# Patient Record
Sex: MALE | Race: WHITE | NOT HISPANIC OR LATINO | Employment: FULL TIME | URBAN - METROPOLITAN AREA
[De-identification: names, ages, dates, MRNs, and addresses within clinical notes are randomized per-mention and may not be internally consistent; named-entity substitution may affect disease eponyms.]

---

## 2024-02-23 ENCOUNTER — TELEPHONE (OUTPATIENT)
Dept: SPORTS MEDICINE | Facility: CLINIC | Age: 64
End: 2024-02-23
Payer: COMMERCIAL

## 2024-02-23 NOTE — TELEPHONE ENCOUNTER
Returned call. No answer and LVM. Advised that several providers are our of the office today and likely would not be able to schedule an appointment for today. Advised that if there was a concern for a fracture, then would recommend going to an urgent care or ER for further evaluation and they can stabilize until patient can be seen by orthopedic provider next week. Left call back number with any questions. AINSLEY    ----- Message from Niki Trent sent at 2/23/2024  9:33 AM CST -----  Regarding: appt  Name of who is calling:   Anabela / wife      What is the request in detail: Wife is requesting a urgent appt today if possible to be seen due to injured right knee and foot swollen from a step down off later / possibly shattering      Can the clinic reply by [facility]:no      What number to call back if not [facility]:[phone]

## 2024-02-26 ENCOUNTER — HOSPITAL ENCOUNTER (OUTPATIENT)
Dept: RADIOLOGY | Facility: HOSPITAL | Age: 64
Discharge: HOME | End: 2024-02-26
Attending: ORTHOPAEDIC SURGERY
Payer: COMMERCIAL

## 2024-02-26 ENCOUNTER — OFFICE VISIT (OUTPATIENT)
Dept: SPORTS MEDICINE | Facility: CLINIC | Age: 64
End: 2024-02-26
Payer: COMMERCIAL

## 2024-02-26 VITALS — HEIGHT: 69 IN | WEIGHT: 220 LBS | BODY MASS INDEX: 32.58 KG/M2

## 2024-02-26 DIAGNOSIS — M17.11 LOCALIZED OSTEOARTHRITIS OF RIGHT KNEE: ICD-10-CM

## 2024-02-26 DIAGNOSIS — M25.561 CHRONIC PAIN OF RIGHT KNEE: ICD-10-CM

## 2024-02-26 DIAGNOSIS — M21.162 GENU VARUM OF LEFT LOWER EXTREMITY: ICD-10-CM

## 2024-02-26 DIAGNOSIS — G89.29 CHRONIC PAIN OF RIGHT KNEE: Primary | ICD-10-CM

## 2024-02-26 DIAGNOSIS — G89.29 CHRONIC PAIN OF RIGHT KNEE: ICD-10-CM

## 2024-02-26 DIAGNOSIS — S83.231A COMPLEX TEAR OF MEDIAL MENISCUS OF RIGHT KNEE AS CURRENT INJURY, INITIAL ENCOUNTER: ICD-10-CM

## 2024-02-26 DIAGNOSIS — M17.0 BILATERAL PRIMARY OSTEOARTHRITIS OF KNEE: ICD-10-CM

## 2024-02-26 DIAGNOSIS — M17.32 POST-TRAUMATIC OSTEOARTHRITIS OF LEFT KNEE: ICD-10-CM

## 2024-02-26 DIAGNOSIS — M25.561 CHRONIC PAIN OF RIGHT KNEE: Primary | ICD-10-CM

## 2024-02-26 PROCEDURE — 99999 PR PBB SHADOW E&M-EST. PATIENT-LVL IV: CPT | Mod: PBBFAC,,, | Performed by: ORTHOPAEDIC SURGERY

## 2024-02-26 PROCEDURE — 97760 ORTHOTIC MGMT&TRAING 1ST ENC: CPT | Mod: S$GLB,,, | Performed by: ORTHOPAEDIC SURGERY

## 2024-02-26 PROCEDURE — 3051F HG A1C>EQUAL 7.0%<8.0%: CPT | Mod: CPTII,S$GLB,, | Performed by: ORTHOPAEDIC SURGERY

## 2024-02-26 PROCEDURE — 1159F MED LIST DOCD IN RCRD: CPT | Mod: CPTII,S$GLB,, | Performed by: ORTHOPAEDIC SURGERY

## 2024-02-26 PROCEDURE — 3008F BODY MASS INDEX DOCD: CPT | Mod: CPTII,S$GLB,, | Performed by: ORTHOPAEDIC SURGERY

## 2024-02-26 PROCEDURE — 99205 OFFICE O/P NEW HI 60 MIN: CPT | Mod: S$GLB,,, | Performed by: ORTHOPAEDIC SURGERY

## 2024-02-26 RX ORDER — EMPAGLIFLOZIN 25 MG/1
25 TABLET, FILM COATED ORAL EVERY MORNING
COMMUNITY

## 2024-02-26 RX ORDER — PEN NEEDLE, DIABETIC 31 GX5/16"
NEEDLE, DISPOSABLE MISCELLANEOUS EVERY MORNING
COMMUNITY

## 2024-02-26 RX ORDER — INSULIN GLARGINE 100 [IU]/ML
40 INJECTION, SOLUTION SUBCUTANEOUS
COMMUNITY
Start: 2024-01-30

## 2024-02-26 RX ORDER — EZETIMIBE 10 MG/1
10 TABLET ORAL EVERY MORNING
COMMUNITY

## 2024-02-26 RX ORDER — METOPROLOL SUCCINATE 25 MG/1
25 TABLET, EXTENDED RELEASE ORAL EVERY MORNING
COMMUNITY

## 2024-02-26 RX ORDER — WARFARIN SODIUM 5 MG/1
5 TABLET ORAL
COMMUNITY
Start: 2023-12-19

## 2024-02-26 RX ORDER — PREGABALIN 150 MG/1
150-300 CAPSULE ORAL 2 TIMES DAILY
COMMUNITY

## 2024-02-26 RX ORDER — ATORVASTATIN CALCIUM 20 MG/1
20 TABLET, FILM COATED ORAL NIGHTLY
COMMUNITY
Start: 2023-10-06

## 2024-02-26 RX ORDER — ROSUVASTATIN CALCIUM 10 MG/1
10 TABLET, COATED ORAL NIGHTLY
COMMUNITY

## 2024-02-26 NOTE — PATIENT INSTRUCTIONS
Assessment:  Ulises Altman is a  63 y.o. male    with a chief complaint of Pain of the Right Knee    Acute Right knee pain and swelling  Started 2/22/24  Left knee post traumatic osteoarthritis  Genu varum  Right knee osteoarthritis     Encounter Diagnoses   Name Primary?    Chronic pain of right knee Yes    Post-traumatic osteoarthritis of left knee     Localized osteoarthritis of right knee     Complex tear of medial meniscus of right knee as current injury, initial encounter     Bilateral primary osteoarthritis of knee     Genu varum of left lower extremity       Plan:  Knee Sleeve for right knee  Deferred right knee CSI 2/2/40- due to history of total heart surgery reccently   Would need clearance from cardiovascular surgeon to proceed  Dr. Gamino   Medial  brace required for valgus instability due to knee varus from medial compartment osteoarthritis for left knee  10 minutes were spent sizing, fitting, and educating regarding durable medical equipment by Dr. Nick Small and his assistant under his direction today.  CPT 08747.  Order bilateral knee visco   Order PT at [facility] - 2-3x per week for 6-8 weeks      Follow-up: 4 weeks or sooner if there are any problems between now and then.    Leave Review:   Google: Leave Google Review  Healthgrades: Leave Healthgrades Review    After Hours Number: [phone]

## 2024-02-26 NOTE — PROGRESS NOTES
"      Patient ID: Ulises Altman  YOB: 1960  MRN: [id number]    Chief Complaint: Pain of the Right Knee      Referred By: First available    History of Present Illness: Ulises Altman is a  63 y.o. male    with a chief complaint of Pain of the Right Knee    The patient presents today with right knee pain. He reports a possible injury last Thursday, 2/22/34. He reports he was stepping up on a ladder and felt a pop. He reports pain and swelling. He reports it started as posterior knee pain. He reports not the pain is medial. He reports swelling. Of note he has a history of a football injury on the Left knee of which underwent reconstruction years ago. He reports pain with walking and bending his knee. Of note he underwent open heart surgery 10 months ago.     HPI    Past Medical History:   History reviewed. No pertinent past medical history.  History reviewed. No pertinent surgical history.  History reviewed. No pertinent family history.  Social History     Socioeconomic History    Marital status:      Medication List with Changes/Refills   Current Medications    ASPIRIN 81 MG CAP        ATORVASTATIN (LIPITOR) 20 MG TABLET    Take 20 mg by mouth every evening.    BD ULTRA-FINE SHORT PEN NEEDLE 31 GAUGE X 5/16" NDLE    Inject into the skin every morning.    BLOOD SUGAR DIAGNOSTIC STRP    by Other route.    EZETIMIBE (ZETIA) 10 MG TABLET    Take 10 mg by mouth every morning.    JARDIANCE 25 MG TABLET    Take 25 mg by mouth every morning.    LANTUS SOLOSTAR U-100 INSULIN GLARGINE 100 UNITS/ML SUBQ PEN    Inject 40 Units into the skin.    METOPROLOL SUCCINATE (TOPROL-XL) 25 MG 24 HR TABLET    Take 25 mg by mouth every morning.    PREGABALIN (LYRICA) 150 MG CAPSULE    Take 150-300 mg by mouth 2 (two) times daily.    ROSUVASTATIN (CRESTOR) 10 MG TABLET    Take 10 mg by mouth every evening.    WARFARIN (COUMADIN) 5 MG TABLET    Take 5 mg by mouth.     Review of patient's allergies " indicates:  No Known Allergies  ROS    Physical Exam:   Body mass index is 32.49 kg/m².  There were no vitals filed for this visit.   GENERAL: Well appearing, appropriate for stated age, no acute distress.  CARDIOVASCULAR: Pulses regular by peripheral palpation.  PULMONARY: Respirations are even and non-labored.  NEURO: Awake, alert, and oriented x 3.  PSYCH: Mood & affect are appropriate.  HEENT: Head is normocephalic and atraumatic.            Right Knee Exam     Inspection   Effusion: present    Tenderness   The patient is tender to palpation of the medial joint line.    Range of Motion   Extension:  5   Flexion:  110     Tests   Meniscus   Janell:  Medial - positive   Ligament Examination   Lachman: normal (-1 to 2mm)   MCL - Valgus: normal (0 to 2mm)  LCL - Varus: normal    Other   Sensation: normal    Comments:  Intact EHL, FHL, gastrocsoleus, and tibialis anterior. Sensation intact to light touch in superficial peroneal, deep peroneal, tibial, sural, and saphenous nerve distributions. Foot warm and well perfused with capillary refill of less than 2 seconds and palpable pedal pulses.      Left Knee Exam     Tenderness   The patient tender to palpation of the medial joint line.    Range of Motion   Extension:  20   Flexion:  110     Muscle Strength   Right Lower Extremity   Hip Abduction: 5/5   Quadriceps:  4/5   Hamstrin/5     Vascular Exam     Right Pulses  Dorsalis Pedis:      2+  Posterior Tibial:      2+          Imaging:    X-ray Knee Ortho Right with Flexion  Narrative: EXAMINATION:  XR KNEE ORTHO RIGHT WITH FLEXION    CLINICAL HISTORY:  Pain in right knee    TECHNIQUE:  AP standing views of both knees, AP flexion views of both knees, lateral view of the right knee and Merchant views of both knees    COMPARISON:  None    FINDINGS:  The medial and lateral compartment joint spaces of the right knee appear to be relatively well maintained.  There is severe joint space narrowing seen involving the  medial compartment the left knee with large staples seen projecting over the medial tibial plateau.  Minimal degenerative involving the right patellofemoral compartment.  A small suprapatellar joint effusion is present.  No acute fracture or dislocation.  Impression: 1.  As above    Electronically signed by: Anton Rodas DO  Date:    02/26/2024  Time:    10:00      Relevant imaging results reviewed and interpreted by me, discussed with the patient and / or family today.     Other Tests:         Patient Instructions   Assessment:  Ulises Altman is a  63 y.o. male    with a chief complaint of Pain of the Right Knee    Acute Right knee pain and swelling  Started 2/22/24  Left knee post traumatic osteoarthritis  Genu varum  Right knee osteoarthritis     Encounter Diagnoses   Name Primary?    Chronic pain of right knee Yes    Post-traumatic osteoarthritis of left knee     Localized osteoarthritis of right knee     Complex tear of medial meniscus of right knee as current injury, initial encounter     Bilateral primary osteoarthritis of knee     Genu varum of left lower extremity       Plan:  Knee Sleeve for right knee  Deferred right knee CSI 2/2/40- due to history of total heart surgery reccently   Would need clearance from cardiovascular surgeon to proceed  Dr. Gamino   Medial  brace required for valgus instability due to knee varus from medial compartment osteoarthritis for left knee  10 minutes were spent sizing, fitting, and educating regarding durable medical equipment by Dr. Nick Small and his assistant under his direction today.  CPT 19700.  Order bilateral knee visco   Order PT at [facility] - 2-3x per week for 6-8 weeks      Follow-up: 4 weeks or sooner if there are any problems between now and then.    Leave Review:   Google: Leave Google Review  Healthgrades: Leave Healthgrades Review    After Hours Number: [phone]       Provider Note/Medical Decision Making:       I  discussed worrisome and red flag signs and symptoms with the patient. The patient expressed understanding and agreed to alert me immediately or to go to the emergency room if they experience any of these.   Treatment plan was developed with input from the patient/family, and they expressed understanding and agreement with the plan. All questions were answered today.          Nick Small MD  Orthopaedic Surgery & Sports Medicine       Disclaimer: This note was prepared using a voice recognition system and is likely to have sound alike errors within the text.     I, Tosha Vazquez, acted as a scribe for Nick Small MD for the duration of this office visit.

## 2024-03-04 ENCOUNTER — CLINICAL SUPPORT (OUTPATIENT)
Facility: HOSPITAL | Age: 64
End: 2024-03-04
Payer: COMMERCIAL

## 2024-03-04 DIAGNOSIS — M25.662 DECREASED RANGE OF MOTION OF LEFT KNEE: Primary | ICD-10-CM

## 2024-03-04 DIAGNOSIS — M17.32 POST-TRAUMATIC OSTEOARTHRITIS OF LEFT KNEE: ICD-10-CM

## 2024-03-04 DIAGNOSIS — M62.81 QUADRICEPS WEAKNESS: ICD-10-CM

## 2024-03-04 PROCEDURE — 97161 PT EVAL LOW COMPLEX 20 MIN: CPT | Mod: PN

## 2024-03-04 PROCEDURE — 97110 THERAPEUTIC EXERCISES: CPT | Mod: PN

## 2024-03-04 NOTE — PLAN OF CARE
[facility]   Physical Therapy Initial Evaluation      Date: 3/4/2024   Name: Ulises Altman  [facility] Number: [id number]    Therapy Diagnosis:   Encounter Diagnoses   Name Primary?    Post-traumatic osteoarthritis of left knee     Decreased range of motion of left knee Yes    Quadriceps weakness       Physician: Nick Small MD     Physician Orders: PT Eval and Treat  Medical Diagnosis from Referral: Post-traumatic osteoarthritis of left knee [M17.32]   Evaluation Date: 3/4/2024  Authorization Period Expiration: 2/25/25  Plan of Care Expiration: 5/4/2024  Progress Note Due: 4/4/2024  Visit # / Visits authorized: 1/1   FOTO: 1/3 (last performed on 3/4/2024)    Precautions: Standard, History of major heart surgery    Time In: 11:00 AM  Time Out: 11:45 AM  Total Billable Time (timed & untimed codes): 45 minutes    Subjective     Date of onset: chronic    History of current condition - Bert reports he has had chronic L knee pain for years. He also reports a history of heart surgery about 1 year ago. He reports after his heart surgery he was not able to move as much and feels like his knee got weaker and more painful.         Pain:  Current 1/10, worst 1/10, best 0/10   Location: [] Right   [x] Left:  knee  Description: aching , unstable  Aggravating Factors: standing from sitting on the floor  Easing Factors: activity avoidance, rest    Prior Therapy:   [x] N/A    [] Yes:   Social History: Pt lives with their family  Occupation: Pt is  - works on copiers, printers, etc  Prior Level of Function: Independent and pain free with all ADL, IADL, community mobility and functional activities.   Current Level of Function: Independent with all ADL, IADL, community mobility and functional activities with reports of increased pain and need for increased time and frequent breaks.       Pts goals: Pt reported goals are to decrease overall pain levels in order to return to prior  functional level.     Medical History:   No past medical history on file.    Surgical History:   Ulises Altman  has no past surgical history on file.    Medications:   Ulises has a current medication list which includes the following prescription(s): aspirin, atorvastatin, bd ultra-fine short pen needle, blood sugar diagnostic, ezetimibe, jardiance, lantus solostar u-100 insulin, metoprolol succinate, pregabalin, rosuvastatin, and warfarin.    Allergies:   Review of patient's allergies indicates:  No Known Allergies     Objective        Range of Motion:   Knee Left    Passive 0-        Lower Extremity Strength  Left LE  Right LE    Knee extension: 4+/5 Knee extension: 4+/5   Knee flexion: 4+/5 Knee flexion: 4+/5   Hip extension:  4/5 Hip extension: 4/5   Hip abduction: 4+/5 Hip abduction: 4+/5         Function:  - Gait within normal limits   - Squat: able to partial squat and rise to stand - increased pain   - Single Leg Squat: not tested  - Single Leg Step Down Test: not tested        Function:     CMS Impairment/Limitation/Restriction for FOTO Knee Survey    Therapist reviewed FOTO scores for Bert on 3/4/2024.   FOTO documents entered into [facility] - see Media section.    Functional Score: 43%         Treatment     Total Treatment time (time-based codes) separate from Evaluation: (10) minutes     Heel Prop TE For knee extension ROM   Gastroc Stretch TE seated   Hamstring stretch TE seated         Patient Education and Home Exercises     Education provided:  PURPOSE: Patient educated on the impairments noted above and the effects of physical therapy intervention to improve overall condition and QOL.   EXERCISE: Patient was educated on all the above exercise prior/during/after for proper posture, positioning, and execution for safe performance with home exercise program.   STRENGTH: Patient educated on the importance of improved core and extremity strength in order to improve alignment of the spine and extremities  "with static positions and dynamic movement.     Written Home Exercises Provided: yes.  Exercises were reviewed and Bert was able to demonstrate them prior to the end of the session.  Bert demonstrated fair  understanding of the education provided. See EMR under Patient Instructions for exercises provided during therapy sessions.    Assessment     Ulises is a 63 y.o. male referred to outpatient Physical Therapy with a medical diagnosis of Post-traumatic osteoarthritis of left knee [M17.32] . Pt presents with impairments in the following categories: IMPAIRMENTS: ROM, strength, endurance, and joint mobility. Patient to consult with cardiology to determine readiness for strength training. Upon approval, prioritize quadriceps strength. He will benefit from continued care.    Pt prognosis is Good  Pt will benefit from skilled outpatient Physical Therapy to address the deficits stated above and in the chart below, provide pt/family education, and to maximize pt's level of independence.     Plan of care discussed with patient: Yes  Pt's spiritual, cultural and educational needs considered and patient is agreeable to the plan of care and goals as stated below:     Anticipated Barriers for therapy: none    Medical Necessity is demonstrated by the following  History  Co-morbidities and personal factors that may impact the plan of care [x] LOW: no personal factors / co-morbidities  [] MODERATE: 1-2 personal factors / co-morbidities  [] HIGH: 3+ personal factors / co-morbidities    Moderate / High Support Documentation:   No past medical history on file.     Examination  Body Structures and Functions, activity limitations and participation restrictions that may impact the plan of care [x] LOW: addressing 1-2 elements  [] MODERATE: 3+ elements  [] HIGH: 4+ elements (please support below)    Moderate / High Support Documentation: See above in "Current Level of Function"      Clinical Presentation [x] LOW: stable  [] MODERATE: " Evolving  [] HIGH: Unstable     Decision Making/ Complexity Score: low         Short Term Goals:  4 weeks Status  Date Met   PAIN: Pt will report worst pain of 3/10 in order to progress toward max functional ability and improve quality of life. [x] Progressing  [] Met  [] Not Met    FUNCTION: Patient will demonstrate improved function as indicated by a functional score of greater than or equal to 50 out of 100 on FOTO. [x] Progressing  [] Met  [] Not Met    MOBILITY: Patient will improve AROM to 50% of stated goals, listed in objective measures above, in order to progress towards independence with functional activities.  [x] Progressing  [] Met  [] Not Met    STRENGTH: Patient will improve strength to 50% of stated goals, listed in objective measures above, in order to progress towards independence with functional activities. [x] Progressing  [] Met  [] Not Met    POSTURE: Patient will correct postural deviations in sitting and standing, to decrease pain and promote long term stability.  [x] Progressing  [] Met  [] Not Met      Long Term Goals:  6 weeks Status Date Met   PAIN: Pt will report worst pain of 2/10 in order to progress toward max functional ability and improve quality of life [x] Progressing  [] Met  [] Not Met    FUNCTION: Patient will demonstrate improved function as indicated by a functional score of greater than or equal to 55 out of 100 on FOTO. [x] Progressing  [] Met  [] Not Met    MOBILITY: Patient will improve AROM to stated goals, listed in objective measures above, in order to return to maximal functional potential and improve quality of life. Goal: Full knee extension range of motion L [x] Progressing  [] Met  [] Not Met    STRENGTH: Patient will improve strength to stated goals, listed in objective measures above, in order to improve functional independence and quality of life. Goal: quad strength 5/5 [x] Progressing  [] Met  [] Not Met      Plan     Plan of care Certification: 3/4/2024 to  5/4/24.    Outpatient Physical Therapy 2 times weekly for 8 weeks to include any combination of the following interventions: virtual visits, dry needling, modalities, electrical stimulation (IFC, Pre-Mod, Attended with Functional Dry Needling), Manual Therapy, Neuromuscular Re-ed, Patient Education, Self Care, Therapeutic Exercise, and Therapeutic Activites       Steven Carbajal, PT, DPT  Physical Therapist  Board-Certified Specialist in Orthopaedic and Sports Physical Therapy  3/4/2024      I CERTIFY THE NEED FOR THESE SERVICES FURNISHED UNDER THIS PLAN OF TREATMENT AND WHILE UNDER MY CARE   Physician's comments:     Physician's Signature: ___________________________________________________

## 2024-03-04 NOTE — PROGRESS NOTES
See plan of care for initial evaluation.        Steven Carbajal, PT, DPT  Physical Therapist  Board-Certified Specialist in Orthopaedic and Sports Physical Therapy  3/4/2024

## 2024-03-15 ENCOUNTER — CLINICAL SUPPORT (OUTPATIENT)
Facility: HOSPITAL | Age: 64
End: 2024-03-15
Payer: COMMERCIAL

## 2024-03-15 DIAGNOSIS — M25.662 DECREASED RANGE OF MOTION OF LEFT KNEE: Primary | ICD-10-CM

## 2024-03-15 DIAGNOSIS — M62.81 QUADRICEPS WEAKNESS: ICD-10-CM

## 2024-03-15 PROCEDURE — 97110 THERAPEUTIC EXERCISES: CPT | Mod: PN

## 2024-03-15 NOTE — PROGRESS NOTES
[facility]   Physical Therapy Treatment Note     Name: Ulises Altman  [facility] Number: [id number]    Therapy Diagnosis:   Encounter Diagnoses   Name Primary?    Decreased range of motion of left knee Yes    Quadriceps weakness      Physician: Nick Small MD    Visit Date: 3/15/2024    Physician Orders: PT Eval and Treat  Medical Diagnosis from Referral: Post-traumatic osteoarthritis of left knee [M17.32]   Evaluation Date: 3/4/2024  Authorization Period Expiration: 2/25/25  Plan of Care Expiration: 5/4/2024  Progress Note Due: 4/4/2024  Visit # / Visits authorized: 1/10 (1/1 eval)  FOTO: 1/3 (last performed on 3/4/2024)    Time In: 10:50  Time Out: 11:45  Total Billable Time: 55 minutes    SUBJECTIVE     Pt reports: he is doing well today. He has no new complaints..  He was compliant with home exercise program.  Response to previous treatment: no notable change  Functional change: no notable change    Pain: 2/10  Location: left knee    OBJECTIVE     Objective Measures updated at progress report unless specified.     Treatment     Bert received the treatments listed below:      Intervention Code  (see below chart) Date/Notes  3/15/24   Upright Bike TE 5 minutes   Standing squats TE 3x10   Matrix Knee Extension TE 10# single leg B - 3x10   Matrix Ham Curl TE 20# single leg B - 3x10   Shuttle Squats: single leg TE 1 band - 3x10 B   Standing Sqauts TE 3x10   55 minutes of Therapeutic Exercise (TE) to develop strength, endurance, ROM, and flexibility. (30157)  0 minutes of Manual therapy (MT) to improve pain and ROM. (83381)  0 minutes of Neuromuscular Re-Education (NR)  to improve: Balance, Coordination, Kinesthetic, Sense, Proprioception, and Posture. (85832)  0 minutes of Therapeutic Activities (TA) to improve functional performance. (21186)  Unattended Electrical Stimulation (ES) for muscle performance and/or pain modulation. (68528)  Vasopneumatic Device Therapy () for management  of swelling/edema. (40745)  BFR: Blood flow restriction applied during exercise  NP: Not Performed    Patient Education and Home Exercises     Home Exercises Provided and Patient Education Provided     Education provided:   - HEP, PT POC    Written Home Exercises Provided: yes. Exercises were reviewed and Bert was able to demonstrate them prior to the end of the session.  Bert demonstrated good  understanding of the education provided. See EMR under Patient Instructions for exercises provided during therapy sessions    ASSESSMENT     Patient tolerated today's treatment well but with significant fatigue. He will benefit from continued care.    Bert Is progressing well towards his goals.   Pt prognosis is Excellent.     Pt will continue to benefit from skilled outpatient physical therapy to address the deficits listed in the problem list box on initial evaluation, provide pt/family education and to maximize pt's level of independence in the home and community environment.     Pt's spiritual, cultural and educational needs considered and pt agreeable to plan of care and goals.     Anticipated barriers to physical therapy: None    Goals:     Short Term Goals:  4 weeks Status  Date Met   PAIN: Pt will report worst pain of 3/10 in order to progress toward max functional ability and improve quality of life. [x] Progressing  [] Met  [] Not Met     FUNCTION: Patient will demonstrate improved function as indicated by a functional score of greater than or equal to 50 out of 100 on FOTO. [x] Progressing  [] Met  [] Not Met     MOBILITY: Patient will improve AROM to 50% of stated goals, listed in objective measures above, in order to progress towards independence with functional activities.  [x] Progressing  [] Met  [] Not Met     STRENGTH: Patient will improve strength to 50% of stated goals, listed in objective measures above, in order to progress towards independence with functional activities. [x] Progressing  [] Met  []  Not Met     POSTURE: Patient will correct postural deviations in sitting and standing, to decrease pain and promote long term stability.  [x] Progressing  [] Met  [] Not Met        Long Term Goals:  6 weeks Status Date Met   PAIN: Pt will report worst pain of 2/10 in order to progress toward max functional ability and improve quality of life [x] Progressing  [] Met  [] Not Met     FUNCTION: Patient will demonstrate improved function as indicated by a functional score of greater than or equal to 55 out of 100 on FOTO. [x] Progressing  [] Met  [] Not Met     MOBILITY: Patient will improve AROM to stated goals, listed in objective measures above, in order to return to maximal functional potential and improve quality of life. Goal: Full knee extension range of motion L [x] Progressing  [] Met  [] Not Met     STRENGTH: Patient will improve strength to stated goals, listed in objective measures above, in order to improve functional independence and quality of life. Goal: quad strength 5/5 [x] Progressing  [] Met  [] Not Met         PLAN   Continue per plan of care.  Progress as tolerated.    Steven Carbajal, PT

## 2024-03-19 ENCOUNTER — CLINICAL SUPPORT (OUTPATIENT)
Facility: HOSPITAL | Age: 64
End: 2024-03-19
Payer: COMMERCIAL

## 2024-03-19 DIAGNOSIS — M62.81 QUADRICEPS WEAKNESS: ICD-10-CM

## 2024-03-19 DIAGNOSIS — M25.662 DECREASED RANGE OF MOTION OF LEFT KNEE: Primary | ICD-10-CM

## 2024-03-19 PROCEDURE — 97110 THERAPEUTIC EXERCISES: CPT | Mod: PN

## 2024-03-20 NOTE — PROGRESS NOTES
[facility]   Physical Therapy Treatment Note     Name: Ulises Altman  [facility] Number: [id number]    Therapy Diagnosis:   Encounter Diagnoses   Name Primary?    Decreased range of motion of left knee Yes    Quadriceps weakness        Physician: Nick Small MD    Visit Date: 3/19/2024    Physician Orders: PT Eval and Treat  Medical Diagnosis from Referral: Post-traumatic osteoarthritis of left knee [M17.32]   Evaluation Date: 3/4/2024  Authorization Period Expiration: 2/25/25  Plan of Care Expiration: 5/4/2024  Progress Note Due: 4/4/2024  Visit # / Visits authorized: 1/10 (1/1 eval)  FOTO: 1/3 (last performed on 3/4/2024)    Time In: 10:55  Time Out: 11:40  Total Billable Time: 45 minutes    SUBJECTIVE     Pt reports: he was somewhat sore after his last visit. Otherwise, he has no new complaints.  He was compliant with home exercise program.  Response to previous treatment: no notable change  Functional change: no notable change    Pain: 2/10  Location: left knee    OBJECTIVE     Objective Measures updated at progress report unless specified.     Treatment     Bert received the treatments listed below:      Intervention Code  (see below chart) Date/Notes  3/19/24   Upright Bike TE 5 minutes   Standing squats TE 3x10   Matrix Knee Extension TE 10# single leg B - 3x10   Matrix Ham Curl TE 20# single leg B - 3x10   Shuttle Squats: single leg TE 1 band - 3x10 B   Standing Squats TE 3x10   45 minutes of Therapeutic Exercise (TE) to develop strength, endurance, ROM, and flexibility. (68494)  0 minutes of Manual therapy (MT) to improve pain and ROM. (09103)  0 minutes of Neuromuscular Re-Education (NR)  to improve: Balance, Coordination, Kinesthetic, Sense, Proprioception, and Posture. (68780)  0 minutes of Therapeutic Activities (TA) to improve functional performance. (88235)  Unattended Electrical Stimulation (ES) for muscle performance and/or pain modulation. (07698)  Vasopneumatic  Device Therapy () for management of swelling/edema. (52893)  BFR: Blood flow restriction applied during exercise  NP: Not Performed    Patient Education and Home Exercises     Home Exercises Provided and Patient Education Provided     Education provided:   - HEP, PT POC    Written Home Exercises Provided: yes. Exercises were reviewed and Bert was able to demonstrate them prior to the end of the session.  Bert demonstrated good  understanding of the education provided. See EMR under Patient Instructions for exercises provided during therapy sessions    ASSESSMENT     Bert demonstrates improved exercise tolerance today with less fatigue. He will benefit from continued care.    Bert Is progressing well towards his goals.   Pt prognosis is Excellent.     Pt will continue to benefit from skilled outpatient physical therapy to address the deficits listed in the problem list box on initial evaluation, provide pt/family education and to maximize pt's level of independence in the home and community environment.     Pt's spiritual, cultural and educational needs considered and pt agreeable to plan of care and goals.     Anticipated barriers to physical therapy: None    Goals:     Short Term Goals:  4 weeks Status  Date Met   PAIN: Pt will report worst pain of 3/10 in order to progress toward max functional ability and improve quality of life. [x] Progressing  [] Met  [] Not Met     FUNCTION: Patient will demonstrate improved function as indicated by a functional score of greater than or equal to 50 out of 100 on FOTO. [x] Progressing  [] Met  [] Not Met     MOBILITY: Patient will improve AROM to 50% of stated goals, listed in objective measures above, in order to progress towards independence with functional activities.  [x] Progressing  [] Met  [] Not Met     STRENGTH: Patient will improve strength to 50% of stated goals, listed in objective measures above, in order to progress towards independence with functional  activities. [x] Progressing  [] Met  [] Not Met     POSTURE: Patient will correct postural deviations in sitting and standing, to decrease pain and promote long term stability.  [x] Progressing  [] Met  [] Not Met        Long Term Goals:  6 weeks Status Date Met   PAIN: Pt will report worst pain of 2/10 in order to progress toward max functional ability and improve quality of life [x] Progressing  [] Met  [] Not Met     FUNCTION: Patient will demonstrate improved function as indicated by a functional score of greater than or equal to 55 out of 100 on FOTO. [x] Progressing  [] Met  [] Not Met     MOBILITY: Patient will improve AROM to stated goals, listed in objective measures above, in order to return to maximal functional potential and improve quality of life. Goal: Full knee extension range of motion L [x] Progressing  [] Met  [] Not Met     STRENGTH: Patient will improve strength to stated goals, listed in objective measures above, in order to improve functional independence and quality of life. Goal: quad strength 5/5 [x] Progressing  [] Met  [] Not Met         PLAN   Continue per plan of care.  Progress as tolerated.    Steven Carbajal, PT

## 2024-03-22 ENCOUNTER — CLINICAL SUPPORT (OUTPATIENT)
Facility: HOSPITAL | Age: 64
End: 2024-03-22
Payer: COMMERCIAL

## 2024-03-22 DIAGNOSIS — M25.662 DECREASED RANGE OF MOTION OF LEFT KNEE: Primary | ICD-10-CM

## 2024-03-22 DIAGNOSIS — M62.81 QUADRICEPS WEAKNESS: ICD-10-CM

## 2024-03-22 PROCEDURE — 97110 THERAPEUTIC EXERCISES: CPT | Mod: PN

## 2024-03-22 NOTE — PROGRESS NOTES
[facility]   Physical Therapy Treatment Note     Name: Ulises Altman  [facility] Number: [id number]    Therapy Diagnosis:   Encounter Diagnoses   Name Primary?    Decreased range of motion of left knee Yes    Quadriceps weakness        Physician: Nick Small MD    Visit Date: 3/22/2024    Physician Orders: PT Eval and Treat  Medical Diagnosis from Referral: Post-traumatic osteoarthritis of left knee [M17.32]   Evaluation Date: 3/4/2024  Authorization Period Expiration: 2/25/25  Plan of Care Expiration: 5/4/2024  Progress Note Due: 4/4/2024  Visit # / Visits authorized: 3/10 (1/1 eval)  FOTO: 1/3 (last performed on 3/4/2024)    Time In: 10:45  Time Out: 11:30  Total Billable Time: 45 minutes    SUBJECTIVE     Pt reports: he has had more non-painful popping in his knee over the last few days. He has no new complaints.  He was compliant with home exercise program.  Response to previous treatment: no notable change  Functional change: no notable change    Pain: 2/10  Location: left knee    OBJECTIVE     Objective Measures updated at progress report unless specified.     Treatment     Bert received the treatments listed below:      Intervention Code  (see below chart) Date/Notes  3/22/24   Upright Bike TE 5 minutes   Standing squats TE 3x10   Matrix Knee Extension TE 15# single leg B - 3x10   Matrix Ham Curl TE 25# single leg B - 3x10   Shuttle Squats: single leg TE 1 band - 3x10 B   Standing Squats TE 3x10   45 minutes of Therapeutic Exercise (TE) to develop strength, endurance, ROM, and flexibility. (05029)  0 minutes of Manual therapy (MT) to improve pain and ROM. (09947)  0 minutes of Neuromuscular Re-Education (NR)  to improve: Balance, Coordination, Kinesthetic, Sense, Proprioception, and Posture. (23870)  0 minutes of Therapeutic Activities (TA) to improve functional performance. (00175)  Unattended Electrical Stimulation (ES) for muscle performance and/or pain modulation.  (05468)  Vasopneumatic Device Therapy () for management of swelling/edema. (12951)  BFR: Blood flow restriction applied during exercise  NP: Not Performed     Patient Education and Home Exercises     Home Exercises Provided and Patient Education Provided     Education provided:   - HEP, PT POC    Written Home Exercises Provided: yes. Exercises were reviewed and Bert was able to demonstrate them prior to the end of the session.  Bert demonstrated good  understanding of the education provided. See EMR under Patient Instructions for exercises provided during therapy sessions    ASSESSMENT     Patient continues to improve well. He will benefit from continued care.    Bert Is progressing well towards his goals.   Pt prognosis is Excellent.     Pt will continue to benefit from skilled outpatient physical therapy to address the deficits listed in the problem list box on initial evaluation, provide pt/family education and to maximize pt's level of independence in the home and community environment.     Pt's spiritual, cultural and educational needs considered and pt agreeable to plan of care and goals.     Anticipated barriers to physical therapy: None    Goals:     Short Term Goals:  4 weeks Status  Date Met   PAIN: Pt will report worst pain of 3/10 in order to progress toward max functional ability and improve quality of life. [x] Progressing  [] Met  [] Not Met     FUNCTION: Patient will demonstrate improved function as indicated by a functional score of greater than or equal to 50 out of 100 on FOTO. [x] Progressing  [] Met  [] Not Met     MOBILITY: Patient will improve AROM to 50% of stated goals, listed in objective measures above, in order to progress towards independence with functional activities.  [x] Progressing  [] Met  [] Not Met     STRENGTH: Patient will improve strength to 50% of stated goals, listed in objective measures above, in order to progress towards independence with functional activities. [x]  Progressing  [] Met  [] Not Met     POSTURE: Patient will correct postural deviations in sitting and standing, to decrease pain and promote long term stability.  [x] Progressing  [] Met  [] Not Met        Long Term Goals:  6 weeks Status Date Met   PAIN: Pt will report worst pain of 2/10 in order to progress toward max functional ability and improve quality of life [x] Progressing  [] Met  [] Not Met     FUNCTION: Patient will demonstrate improved function as indicated by a functional score of greater than or equal to 55 out of 100 on FOTO. [x] Progressing  [] Met  [] Not Met     MOBILITY: Patient will improve AROM to stated goals, listed in objective measures above, in order to return to maximal functional potential and improve quality of life. Goal: Full knee extension range of motion L [x] Progressing  [] Met  [] Not Met     STRENGTH: Patient will improve strength to stated goals, listed in objective measures above, in order to improve functional independence and quality of life. Goal: quad strength 5/5 [x] Progressing  [] Met  [] Not Met         PLAN   Continue per plan of care.  Progress as tolerated.    Steven Carbajal, PT

## 2024-03-25 ENCOUNTER — OFFICE VISIT (OUTPATIENT)
Dept: SPORTS MEDICINE | Facility: CLINIC | Age: 64
End: 2024-03-25
Payer: COMMERCIAL

## 2024-03-25 DIAGNOSIS — M17.0 BILATERAL PRIMARY OSTEOARTHRITIS OF KNEE: Primary | ICD-10-CM

## 2024-03-25 PROCEDURE — 99999 PR PBB SHADOW E&M-EST. PATIENT-LVL I: CPT | Mod: PBBFAC,,, | Performed by: ORTHOPAEDIC SURGERY

## 2024-03-25 PROCEDURE — 99499 UNLISTED E&M SERVICE: CPT | Mod: S$GLB,,, | Performed by: ORTHOPAEDIC SURGERY

## 2024-03-25 PROCEDURE — 20610 DRAIN/INJ JOINT/BURSA W/O US: CPT | Mod: 50,S$GLB,, | Performed by: ORTHOPAEDIC SURGERY

## 2024-03-25 NOTE — PROCEDURES
Large Joint Aspiration/Injection: bilateral knee    Date/Time: 3/25/2024 11:45 AM    Performed by: Nick Small MD  Authorized by: Nick Small MD    Consent Done?:  Yes (Verbal)  Indications:  Arthritis  Site marked: the procedure site was marked    Timeout: prior to procedure the correct patient, procedure, and site was verified    Prep: patient was prepped and draped in usual sterile fashion    Local anesthesia used?: No    Local anesthetic:  Topical anesthetic    Details:  Needle Size:  22 G  Ultrasonic Guidance for needle placement?: No    Approach:  Superior  Location:  Knee  Laterality:  Bilateral  Site:  Bilateral knee  Medications (Right):  15 mg sodium hyaluronate (orthovisc) 30 mg/2 mL  Medications (Left):  15 mg sodium hyaluronate (orthovisc) 30 mg/2 mL  Patient tolerance:  Patient tolerated the procedure well with no immediate complications     Bilateral Orthovisc #1/3    
No

## 2024-03-28 ENCOUNTER — CLINICAL SUPPORT (OUTPATIENT)
Facility: HOSPITAL | Age: 64
End: 2024-03-28
Payer: COMMERCIAL

## 2024-03-28 DIAGNOSIS — M62.81 QUADRICEPS WEAKNESS: ICD-10-CM

## 2024-03-28 DIAGNOSIS — M25.662 DECREASED RANGE OF MOTION OF LEFT KNEE: Primary | ICD-10-CM

## 2024-03-28 PROCEDURE — 97110 THERAPEUTIC EXERCISES: CPT | Mod: PN

## 2024-03-29 NOTE — PROGRESS NOTES
[facility]   Physical Therapy Treatment Note     Name: Ulises Altman  [facility] Number: [id number]    Therapy Diagnosis:   Encounter Diagnoses   Name Primary?    Decreased range of motion of left knee Yes    Quadriceps weakness          Physician: Nick Small MD    Visit Date: 3/28/2024    Physician Orders: PT Eval and Treat  Medical Diagnosis from Referral: Post-traumatic osteoarthritis of left knee [M17.32]   Evaluation Date: 3/4/2024  Authorization Period Expiration: 2/25/25  Plan of Care Expiration: 5/4/2024  Progress Note Due: 4/4/2024  Visit # / Visits authorized: 4/10 (1/1 eval)  FOTO: 1/3 (last performed on 3/4/2024)    Time In: 1:35 PM  Time Out: 2:30  Total Billable Time: 55 minutes    SUBJECTIVE     Pt reports: he is doing well today. He reports he had an injection earlier this week but hasn't noticed any major change yet.  He was compliant with home exercise program.  Response to previous treatment: no notable change  Functional change: no notable change    Pain: 2/10  Location: left knee    OBJECTIVE     Objective Measures updated at progress report unless specified.     Treatment     Bert received the treatments listed below:      Intervention Code  (see below chart) Date/Notes  3/28/24   Upright Bike TE 5 minutes   Standing squats TE 3x10   Matrix Knee Extension TE 15# single leg B - 3x10   Matrix Ham Curl TE 25# single leg B - 3x10   Shuttle Squats: single leg TE 1 band - 3x10 B   Standing Squats TE 3x10       55 minutes of Therapeutic Exercise (TE) to develop strength, endurance, ROM, and flexibility. (89238)  0 minutes of Manual therapy (MT) to improve pain and ROM. (69808)  0 minutes of Neuromuscular Re-Education (NR)  to improve: Balance, Coordination, Kinesthetic, Sense, Proprioception, and Posture. (52156)  0 minutes of Therapeutic Activities (TA) to improve functional performance. (17981)  Unattended Electrical Stimulation (ES) for muscle performance and/or pain  modulation. (63897)  Vasopneumatic Device Therapy () for management of swelling/edema. (89080)  BFR: Blood flow restriction applied during exercise  NP: Not Performed     Patient Education and Home Exercises     Home Exercises Provided and Patient Education Provided     Education provided:   - HEP, PT POC    Written Home Exercises Provided: yes. Exercises were reviewed and Bert was able to demonstrate them prior to the end of the session.  Bert demonstrated good  understanding of the education provided. See EMR under Patient Instructions for exercises provided during therapy sessions    ASSESSMENT     Patient tolerated today's treatment well. Attempted side stepping activity but reproduced patient's familiar complaint of instability. He will benefit from continued care.    Bert Is progressing well towards his goals.   Pt prognosis is Excellent.     Pt will continue to benefit from skilled outpatient physical therapy to address the deficits listed in the problem list box on initial evaluation, provide pt/family education and to maximize pt's level of independence in the home and community environment.     Pt's spiritual, cultural and educational needs considered and pt agreeable to plan of care and goals.     Anticipated barriers to physical therapy: None    Goals:     Short Term Goals:  4 weeks Status  Date Met   PAIN: Pt will report worst pain of 3/10 in order to progress toward max functional ability and improve quality of life. [x] Progressing  [] Met  [] Not Met     FUNCTION: Patient will demonstrate improved function as indicated by a functional score of greater than or equal to 50 out of 100 on FOTO. [x] Progressing  [] Met  [] Not Met     MOBILITY: Patient will improve AROM to 50% of stated goals, listed in objective measures above, in order to progress towards independence with functional activities.  [x] Progressing  [] Met  [] Not Met     STRENGTH: Patient will improve strength to 50% of stated goals,  listed in objective measures above, in order to progress towards independence with functional activities. [x] Progressing  [] Met  [] Not Met     POSTURE: Patient will correct postural deviations in sitting and standing, to decrease pain and promote long term stability.  [x] Progressing  [] Met  [] Not Met        Long Term Goals:  6 weeks Status Date Met   PAIN: Pt will report worst pain of 2/10 in order to progress toward max functional ability and improve quality of life [x] Progressing  [] Met  [] Not Met     FUNCTION: Patient will demonstrate improved function as indicated by a functional score of greater than or equal to 55 out of 100 on FOTO. [x] Progressing  [] Met  [] Not Met     MOBILITY: Patient will improve AROM to stated goals, listed in objective measures above, in order to return to maximal functional potential and improve quality of life. Goal: Full knee extension range of motion L [x] Progressing  [] Met  [] Not Met     STRENGTH: Patient will improve strength to stated goals, listed in objective measures above, in order to improve functional independence and quality of life. Goal: quad strength 5/5 [x] Progressing  [] Met  [] Not Met         PLAN   Continue per plan of care.  Progress as tolerated.    Steven Carbajal, PT

## 2024-04-01 ENCOUNTER — OFFICE VISIT (OUTPATIENT)
Dept: SPORTS MEDICINE | Facility: CLINIC | Age: 64
End: 2024-04-01
Payer: COMMERCIAL

## 2024-04-01 DIAGNOSIS — M17.0 BILATERAL PRIMARY OSTEOARTHRITIS OF KNEE: Primary | ICD-10-CM

## 2024-04-01 PROCEDURE — 20610 DRAIN/INJ JOINT/BURSA W/O US: CPT | Mod: 50,S$GLB,, | Performed by: ORTHOPAEDIC SURGERY

## 2024-04-01 PROCEDURE — 99999 PR PBB SHADOW E&M-EST. PATIENT-LVL II: CPT | Mod: PBBFAC,,, | Performed by: ORTHOPAEDIC SURGERY

## 2024-04-01 PROCEDURE — 99499 UNLISTED E&M SERVICE: CPT | Mod: S$GLB,,, | Performed by: ORTHOPAEDIC SURGERY

## 2024-04-01 NOTE — PROCEDURES
Large Joint Aspiration/Injection: bilateral knee    Date/Time: 4/1/2024 11:45 AM    Performed by: Nick Small MD  Authorized by: Nick Small MD    Consent Done?:  Yes (Verbal)  Indications:  Arthritis  Site marked: the procedure site was marked    Timeout: prior to procedure the correct patient, procedure, and site was verified    Prep: patient was prepped and draped in usual sterile fashion    Local anesthesia used?: No    Local anesthetic:  Topical anesthetic    Details:  Needle Size:  22 G  Ultrasonic Guidance for needle placement?: No    Approach:  Superior  Location:  Knee  Laterality:  Bilateral  Site:  Bilateral knee  Medications (Right):  15 mg sodium hyaluronate (orthovisc) 30 mg/2 mL  Medications (Left):  15 mg sodium hyaluronate (orthovisc) 30 mg/2 mL  Patient tolerance:  Patient tolerated the procedure well with no immediate complications     Bilateral Orthovisc 2/3

## 2024-04-05 ENCOUNTER — CLINICAL SUPPORT (OUTPATIENT)
Facility: HOSPITAL | Age: 64
End: 2024-04-05
Payer: COMMERCIAL

## 2024-04-05 DIAGNOSIS — M62.81 QUADRICEPS WEAKNESS: ICD-10-CM

## 2024-04-05 DIAGNOSIS — M25.662 DECREASED RANGE OF MOTION OF LEFT KNEE: Primary | ICD-10-CM

## 2024-04-05 PROCEDURE — 97110 THERAPEUTIC EXERCISES: CPT | Mod: PN

## 2024-04-05 NOTE — PROGRESS NOTES
[facility]   Physical Therapy Treatment Note     Name: Ulises Altman  [facility] Number: [id number]    Therapy Diagnosis:   Encounter Diagnoses   Name Primary?    Decreased range of motion of left knee Yes    Quadriceps weakness          Physician: Nick Small MD    Visit Date: 4/5/2024    Physician Orders: PT Eval and Treat  Medical Diagnosis from Referral: Post-traumatic osteoarthritis of left knee [M17.32]   Evaluation Date: 3/4/2024  Authorization Period Expiration: 2/25/25  Plan of Care Expiration: 5/4/2024  Progress Note Due: 4/4/2024  Visit # / Visits authorized: 4/10 (1/1 eval)  FOTO: 1/3 (last performed on 3/4/2024)    Time In: 11:00 AM  Time Out: 11:45 AM  Total Billable Time: 45 minutes    SUBJECTIVE     Pt reports: He is sore from a recent injection.  He was compliant with home exercise program.  Response to previous treatment: no notable change  Functional change: no notable change    Pain: 2/10  Location: left knee    OBJECTIVE     Objective Measures updated at progress report unless specified.     Treatment     Bert received the treatments listed below:      Intervention Code  (see below chart) Date/Notes  4/5/24   Upright Bike TE 5 minutes   Standing squats TE 3x10   Matrix Knee Extension TE 15# single leg B - 3x10   Matrix Ham Curl TE 25# single leg B - 3x10   Shuttle Squats: single leg TE 1 band - 3x10 B   Standing Squats TE 3x10       45 minutes of Therapeutic Exercise (TE) to develop strength, endurance, ROM, and flexibility. (53093)  0 minutes of Manual therapy (MT) to improve pain and ROM. (08897)  0 minutes of Neuromuscular Re-Education (NR)  to improve: Balance, Coordination, Kinesthetic, Sense, Proprioception, and Posture. (78633)  0 minutes of Therapeutic Activities (TA) to improve functional performance. (46680)  Unattended Electrical Stimulation (ES) for muscle performance and/or pain modulation. (43458)  Vasopneumatic Device Therapy () for management of  swelling/edema. (86140)  BFR: Blood flow restriction applied during exercise  NP: Not Performed      Patient Education and Home Exercises     Home Exercises Provided and Patient Education Provided     Education provided:   - HEP, PT POC    Written Home Exercises Provided: yes. Exercises were reviewed and Bert was able to demonstrate them prior to the end of the session.  Bert demonstrated good  understanding of the education provided. See EMR under Patient Instructions for exercises provided during therapy sessions    ASSESSMENT     Patient progression limited secondary to post-injection knee pain. He will benefit from continued physical therapy care.    Bert Is progressing well towards his goals.   Pt prognosis is Excellent.     Pt will continue to benefit from skilled outpatient physical therapy to address the deficits listed in the problem list box on initial evaluation, provide pt/family education and to maximize pt's level of independence in the home and community environment.     Pt's spiritual, cultural and educational needs considered and pt agreeable to plan of care and goals.     Anticipated barriers to physical therapy: None    Goals:     Short Term Goals:  4 weeks Status  Date Met   PAIN: Pt will report worst pain of 3/10 in order to progress toward max functional ability and improve quality of life. [x] Progressing  [] Met  [] Not Met     FUNCTION: Patient will demonstrate improved function as indicated by a functional score of greater than or equal to 50 out of 100 on FOTO. [x] Progressing  [] Met  [] Not Met     MOBILITY: Patient will improve AROM to 50% of stated goals, listed in objective measures above, in order to progress towards independence with functional activities.  [x] Progressing  [] Met  [] Not Met     STRENGTH: Patient will improve strength to 50% of stated goals, listed in objective measures above, in order to progress towards independence with functional activities. [x]  Progressing  [] Met  [] Not Met     POSTURE: Patient will correct postural deviations in sitting and standing, to decrease pain and promote long term stability.  [x] Progressing  [] Met  [] Not Met        Long Term Goals:  6 weeks Status Date Met   PAIN: Pt will report worst pain of 2/10 in order to progress toward max functional ability and improve quality of life [x] Progressing  [] Met  [] Not Met     FUNCTION: Patient will demonstrate improved function as indicated by a functional score of greater than or equal to 55 out of 100 on FOTO. [x] Progressing  [] Met  [] Not Met     MOBILITY: Patient will improve AROM to stated goals, listed in objective measures above, in order to return to maximal functional potential and improve quality of life. Goal: Full knee extension range of motion L [x] Progressing  [] Met  [] Not Met     STRENGTH: Patient will improve strength to stated goals, listed in objective measures above, in order to improve functional independence and quality of life. Goal: quad strength 5/5 [x] Progressing  [] Met  [] Not Met         PLAN   Continue per plan of care.  Progress as tolerated.    Steven Carbajal, PT

## 2024-04-08 ENCOUNTER — OFFICE VISIT (OUTPATIENT)
Dept: SPORTS MEDICINE | Facility: CLINIC | Age: 64
End: 2024-04-08
Payer: COMMERCIAL

## 2024-04-08 VITALS — WEIGHT: 220 LBS | RESPIRATION RATE: 17 BRPM | BODY MASS INDEX: 32.58 KG/M2 | HEIGHT: 69 IN

## 2024-04-08 DIAGNOSIS — M17.0 BILATERAL PRIMARY OSTEOARTHRITIS OF KNEE: Primary | ICD-10-CM

## 2024-04-08 PROCEDURE — 99499 UNLISTED E&M SERVICE: CPT | Mod: S$GLB,,, | Performed by: ORTHOPAEDIC SURGERY

## 2024-04-08 PROCEDURE — 20610 DRAIN/INJ JOINT/BURSA W/O US: CPT | Mod: 50,S$GLB,, | Performed by: ORTHOPAEDIC SURGERY

## 2024-04-08 PROCEDURE — 99999 PR PBB SHADOW E&M-EST. PATIENT-LVL III: CPT | Mod: PBBFAC,,, | Performed by: ORTHOPAEDIC SURGERY

## 2024-04-08 NOTE — PROCEDURES
Large Joint Aspiration/Injection: bilateral knee    Date/Time: 4/8/2024 10:30 AM    Performed by: Tosha Vazquez  Authorized by: Nick Small MD    Consent Done?:  Yes (Verbal)  Indications:  Arthritis  Site marked: the procedure site was marked    Timeout: prior to procedure the correct patient, procedure, and site was verified    Prep: patient was prepped and draped in usual sterile fashion    Local anesthesia used?: No    Local anesthetic:  Topical anesthetic    Details:  Needle Size:  22 G  Ultrasonic Guidance for needle placement?: No    Approach:  Superior  Location:  Knee  Laterality:  Bilateral  Site:  Bilateral knee  Medications (Right):  15 mg sodium hyaluronate (orthovisc) 30 mg/2 mL  Medications (Left):  15 mg sodium hyaluronate (orthovisc) 30 mg/2 mL  Patient tolerance:  Patient tolerated the procedure well with no immediate complications     Bilateral Orthovisc #3/3

## 2024-04-09 ENCOUNTER — CLINICAL SUPPORT (OUTPATIENT)
Facility: HOSPITAL | Age: 64
End: 2024-04-09
Payer: COMMERCIAL

## 2024-04-09 DIAGNOSIS — M62.81 QUADRICEPS WEAKNESS: ICD-10-CM

## 2024-04-09 DIAGNOSIS — M25.662 DECREASED RANGE OF MOTION OF LEFT KNEE: Primary | ICD-10-CM

## 2024-04-09 PROCEDURE — 97110 THERAPEUTIC EXERCISES: CPT | Mod: PN

## 2024-04-09 NOTE — PROGRESS NOTES
[facility]   Physical Therapy Treatment Note     Name: Ulises Altman  [facility] Number: [id number]    Therapy Diagnosis:   Encounter Diagnoses   Name Primary?    Decreased range of motion of left knee Yes    Quadriceps weakness          Physician: Nick Small MD    Visit Date: 4/9/2024    Physician Orders: PT Eval and Treat  Medical Diagnosis from Referral: Post-traumatic osteoarthritis of left knee [M17.32]   Evaluation Date: 3/4/2024  Authorization Period Expiration: 2/25/25  Plan of Care Expiration: 5/4/2024  Progress Note Due: 5/9/24  Visit # / Visits authorized: 6/10 (1/1 eval)  FOTO: 1/3 (last performed on 3/4/2024)    Time In: 10:50 AM  Time Out: 11:40 AM  Total Billable Time: 50 minutes    SUBJECTIVE     Pt reports: he is doing well today. He reports he had his third round of injections yesterday. He reports he hasn't noticed a big change in his symptoms yet.  He was compliant with home exercise program.  Response to previous treatment: no notable change  Functional change: no notable change    Pain: 2/10  Location: left knee    OBJECTIVE     Objective Measures updated at progress report unless specified.     Treatment     Bert received the treatments listed below:      Intervention Code  (see below chart) Date/Notes  4/9/24   Upright Bike TE 5 minutes   Standing squats TE 3x10   Matrix Knee Extension TE 20# single leg B - 3x10   Matrix Ham Curl TE 30# single leg B - 3x10   Shuttle Squats: single leg TE 2 band - 3x10 B   Standing Squats TE 3x10         45 minutes of Therapeutic Exercise (TE) to develop strength, endurance, ROM, and flexibility. (20521)  0 minutes of Manual therapy (MT) to improve pain and ROM. (42259)  0 minutes of Neuromuscular Re-Education (NR)  to improve: Balance, Coordination, Kinesthetic, Sense, Proprioception, and Posture. (56818)  0 minutes of Therapeutic Activities (TA) to improve functional performance. (61662)  Unattended Electrical Stimulation (ES)  for muscle performance and/or pain modulation. (78798)  Vasopneumatic Device Therapy () for management of swelling/edema. (07299)  BFR: Blood flow restriction applied during exercise  NP: Not Performed      Patient Education and Home Exercises     Home Exercises Provided and Patient Education Provided     Education provided:   - HEP, PT POC    Written Home Exercises Provided: yes. Exercises were reviewed and Bert was able to demonstrate them prior to the end of the session.  Bert demonstrated good  understanding of the education provided. See EMR under Patient Instructions for exercises provided during therapy sessions    ASSESSMENT     Patient tolerated today's treatment well. He demonstrates improved strength with knee flexion and extension. He will benefit from continued care.    Bert Is progressing well towards his goals.   Pt prognosis is Excellent.     Pt will continue to benefit from skilled outpatient physical therapy to address the deficits listed in the problem list box on initial evaluation, provide pt/family education and to maximize pt's level of independence in the home and community environment.     Pt's spiritual, cultural and educational needs considered and pt agreeable to plan of care and goals.     Anticipated barriers to physical therapy: None    Goals:     Short Term Goals:  4 weeks Status  Date Met   PAIN: Pt will report worst pain of 3/10 in order to progress toward max functional ability and improve quality of life. [x] Progressing  [] Met  [] Not Met     FUNCTION: Patient will demonstrate improved function as indicated by a functional score of greater than or equal to 50 out of 100 on FOTO. [x] Progressing  [] Met  [] Not Met     MOBILITY: Patient will improve AROM to 50% of stated goals, listed in objective measures above, in order to progress towards independence with functional activities.  [x] Progressing  [] Met  [] Not Met     STRENGTH: Patient will improve strength to 50% of  stated goals, listed in objective measures above, in order to progress towards independence with functional activities. [x] Progressing  [] Met  [] Not Met     POSTURE: Patient will correct postural deviations in sitting and standing, to decrease pain and promote long term stability.  [x] Progressing  [] Met  [] Not Met        Long Term Goals:  6 weeks Status Date Met   PAIN: Pt will report worst pain of 2/10 in order to progress toward max functional ability and improve quality of life [x] Progressing  [] Met  [] Not Met     FUNCTION: Patient will demonstrate improved function as indicated by a functional score of greater than or equal to 55 out of 100 on FOTO. [x] Progressing  [] Met  [] Not Met     MOBILITY: Patient will improve AROM to stated goals, listed in objective measures above, in order to return to maximal functional potential and improve quality of life. Goal: Full knee extension range of motion L [x] Progressing  [] Met  [] Not Met     STRENGTH: Patient will improve strength to stated goals, listed in objective measures above, in order to improve functional independence and quality of life. Goal: quad strength 5/5 [x] Progressing  [] Met  [] Not Met         PLAN   Continue per plan of care.  Progress as tolerated.    Steven Carbajal, PT

## 2024-04-16 ENCOUNTER — CLINICAL SUPPORT (OUTPATIENT)
Facility: HOSPITAL | Age: 64
End: 2024-04-16
Payer: COMMERCIAL

## 2024-04-16 DIAGNOSIS — M25.662 DECREASED RANGE OF MOTION OF LEFT KNEE: Primary | ICD-10-CM

## 2024-04-16 DIAGNOSIS — M62.81 QUADRICEPS WEAKNESS: ICD-10-CM

## 2024-04-16 PROCEDURE — 97110 THERAPEUTIC EXERCISES: CPT | Mod: PN

## 2024-04-16 NOTE — PROGRESS NOTES
[facility]   Physical Therapy Treatment Note     Name: Ulises Altman  [facility] Number: [id number]    Therapy Diagnosis:   Encounter Diagnoses   Name Primary?    Decreased range of motion of left knee Yes    Quadriceps weakness          Physician: Nick Small MD    Visit Date: 4/16/2024    Physician Orders: PT Eval and Treat  Medical Diagnosis from Referral: Post-traumatic osteoarthritis of left knee [M17.32]   Evaluation Date: 3/4/2024  Authorization Period Expiration: 2/25/25  Plan of Care Expiration: 5/4/2024  Progress Note Due: 5/9/24  Visit # / Visits authorized: 7/10 (1/1 eval)  FOTO: 1/3 (last performed on 3/4/2024)    Time In: 10:50 AM  Time Out: 11:40 AM  Total Billable Time: 50 minutes    SUBJECTIVE     Pt reports: he is ready to discharge from formal physical therapy today and that he will continue to exercise on his own.  He was compliant with home exercise program.  Response to previous treatment: no notable change  Functional change: no notable change    Pain: 2/10  Location: left knee    OBJECTIVE     FOTO Function Score: 54%    Treatment     Bert received the treatments listed below:      Intervention Code  (see below chart) Date/Notes  4/16/24   Upright Bike TE 5 minutes   Standing squats TE 3x10   Matrix Knee Extension TE 20# single leg B - 3x10   Matrix Ham Curl TE 30# single leg B - 3x10   Shuttle Squats: single leg TE 2 band - 3x10 B   Standing Squats TE NP         45 minutes of Therapeutic Exercise (TE) to develop strength, endurance, ROM, and flexibility. (50051)  0 minutes of Manual therapy (MT) to improve pain and ROM. (26520)  0 minutes of Neuromuscular Re-Education (NR)  to improve: Balance, Coordination, Kinesthetic, Sense, Proprioception, and Posture. (46132)  0 minutes of Therapeutic Activities (TA) to improve functional performance. (89816)  Unattended Electrical Stimulation (ES) for muscle performance and/or pain modulation. (68286)  Vasopneumatic  Device Therapy () for management of swelling/edema. (41846)  BFR: Blood flow restriction applied during exercise  NP: Not Performed      Patient Education and Home Exercises     Home Exercises Provided and Patient Education Provided     Education provided:   - HEP, PT POC    Written Home Exercises Provided: yes. Exercises were reviewed and Bert was able to demonstrate them prior to the end of the session.  Bert demonstrated good  understanding of the education provided. See EMR under Patient Instructions for exercises provided during therapy sessions    ASSESSMENT     Patient elects to self-discharge from formal physical therapy today. He has made improvements in his strength and functional ability. He will benefit from continued care.        Goals:     Short Term Goals:  4 weeks Status  Date Met   PAIN: Pt will report worst pain of 3/10 in order to progress toward max functional ability and improve quality of life. [x] Progressing  [] Met  [] Not Met     FUNCTION: Patient will demonstrate improved function as indicated by a functional score of greater than or equal to 50 out of 100 on FOTO. [x] Progressing  [] Met  [] Not Met     MOBILITY: Patient will improve AROM to 50% of stated goals, listed in objective measures above, in order to progress towards independence with functional activities.  [x] Progressing  [] Met  [] Not Met     STRENGTH: Patient will improve strength to 50% of stated goals, listed in objective measures above, in order to progress towards independence with functional activities. [x] Progressing  [] Met  [] Not Met     POSTURE: Patient will correct postural deviations in sitting and standing, to decrease pain and promote long term stability.  [x] Progressing  [] Met  [] Not Met        Long Term Goals:  6 weeks Status Date Met   PAIN: Pt will report worst pain of 2/10 in order to progress toward max functional ability and improve quality of life [x] Progressing  [] Met  [] Not Met      FUNCTION: Patient will demonstrate improved function as indicated by a functional score of greater than or equal to 55 out of 100 on FOTO. [x] Progressing  [] Met  [] Not Met     MOBILITY: Patient will improve AROM to stated goals, listed in objective measures above, in order to return to maximal functional potential and improve quality of life. Goal: Full knee extension range of motion L [x] Progressing  [] Met  [] Not Met     STRENGTH: Patient will improve strength to stated goals, listed in objective measures above, in order to improve functional independence and quality of life. Goal: quad strength 5/5 [x] Progressing  [] Met  [] Not Met         PLAN   Discharge to [facility] and independent fitness program.    Steven Carbajal, PT